# Patient Record
Sex: MALE | Race: ASIAN | NOT HISPANIC OR LATINO
[De-identification: names, ages, dates, MRNs, and addresses within clinical notes are randomized per-mention and may not be internally consistent; named-entity substitution may affect disease eponyms.]

---

## 2025-07-11 ENCOUNTER — DASHBOARD ENCOUNTERS (OUTPATIENT)
Age: 50
End: 2025-07-11

## 2025-07-11 ENCOUNTER — OFFICE VISIT (OUTPATIENT)
Dept: URBAN - METROPOLITAN AREA CLINIC 12 | Facility: CLINIC | Age: 50
End: 2025-07-11
Payer: COMMERCIAL

## 2025-07-11 DIAGNOSIS — Z12.11 COLON CANCER SCREENING: ICD-10-CM

## 2025-07-11 PROBLEM — 305058001: Status: ACTIVE | Noted: 2025-07-11

## 2025-07-11 PROCEDURE — 99202 OFFICE O/P NEW SF 15 MIN: CPT

## 2025-07-11 NOTE — HPI-TODAY'S VISIT:
49-year-old male presents today for his first colonoscopy. Asymptomatic today.  Denies changes in bowel habits, abdominal pain, rectal bleeding. No family history of colorectal cancer. Denies problems with heart, lungs, or kidneys.  Denies prior anesthesia exposure.